# Patient Record
Sex: MALE | Race: WHITE | HISPANIC OR LATINO
[De-identification: names, ages, dates, MRNs, and addresses within clinical notes are randomized per-mention and may not be internally consistent; named-entity substitution may affect disease eponyms.]

---

## 2024-09-25 ENCOUNTER — NON-APPOINTMENT (OUTPATIENT)
Age: 24
End: 2024-09-25

## 2024-09-26 ENCOUNTER — APPOINTMENT (OUTPATIENT)
Dept: NEUROSURGERY | Facility: CLINIC | Age: 24
End: 2024-09-26
Payer: COMMERCIAL

## 2024-09-26 VITALS
WEIGHT: 177 LBS | HEIGHT: 65 IN | RESPIRATION RATE: 16 BRPM | BODY MASS INDEX: 29.49 KG/M2 | DIASTOLIC BLOOD PRESSURE: 79 MMHG | OXYGEN SATURATION: 96 % | HEART RATE: 89 BPM | SYSTOLIC BLOOD PRESSURE: 131 MMHG

## 2024-09-26 DIAGNOSIS — G91.9 HYDROCEPHALUS, UNSPECIFIED: ICD-10-CM

## 2024-09-26 DIAGNOSIS — I61.9 NONTRAUMATIC INTRACEREBRAL HEMORRHAGE, UNSPECIFIED: ICD-10-CM

## 2024-09-26 PROCEDURE — 99205 OFFICE O/P NEW HI 60 MIN: CPT

## 2024-09-26 NOTE — REVIEW OF SYSTEMS
[As Noted in HPI] : as noted in HPI [Negative] : Musculoskeletal [FreeTextEntry3] : occasional eye strain

## 2024-09-26 NOTE — ASSESSMENT
[FreeTextEntry1] : 24 year old male with spontaneous right frontal intraventricular hemorrhage. Angiogram negative. 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 Neurologically intact.  Discussion: Ct scan showed some hydrocephalus which is improving when compared to previous scans- likely the cause of the headache, n/v. Some pts can get a delayed lower pressure hydrocephalus- symptoms would include trouble with balance, walking, urinary incontinence, and short-term memory loss- usually progressive. We need to follow clinically and radiographically Ok to walk daily, would avoid strenuous workouts, do not take ANY recreational drugs specifically ecstasy and cocaine which can elevate your heart rate.  It is unusual for a 24-year-old that is healthy to have a brain bleed Usually caused by:  Vascular malformation, cavernoma, or a tumor (a tumor is unlikely)  Explained that cavernomas are not visible on angiograms, although DVAs which are often associated with cavernomas are visible on angiograms Recommendation: repeat angiogram and MRI in 6-8 weeks after the hemorrhage resoles. If it's a cavernoma in the ventricle- it would not be unreasonable to remove it- surgery in that area is safe but we need more images after hemorrhage resolves to better visualize the lesion. Cavernomas have a 1.5%-3% annual hemorrhage rate. In the ventricle, this could cause hydrocephalus and he is young, so resection is reasonable (if it is a cavernoma, or if there is any concern for tumor). Risks and benefits of surgery were discussed. May return to office after repeat MRI and angiogram are completed to discuss recommendations for next steps. contact office with any questions or concerns.   .Jarrett ODELL evaluated the patient with the nurse practitioner Joann Barthelemy and established the plan of care. I personally discuss this patient with the nurse practitioner at the time of the visit. I agree with the assessment and plan as written, unless noted below.

## 2024-09-26 NOTE — HISTORY OF PRESENT ILLNESS
[< 3 months] : less than 3 months [FreeTextEntry1] : brain bleed  [de-identified] : Mr. De Dios, a 24-year-old left-handed male with no significant medical history, he experienced headaches and vomiting for five days, worse in the morning, initially attributing the symptoms to food poisoning. On 8/28, his symptoms worsened, including feeling of faint and photosensitivity. He was treated at [facility] with IV fluids, magnesium, and Tylenol. Imaging was not available to review on [facility] PACS, but limited review of his imaging was reviewed on his [facility] bryce on his cell phone.  It appears that he had a CT head on 8/28 that showed spontaneous right lateral horn intraventricular hemorrhage with trapping of his right temporal horn of his right lateral ventricle. He had an angiogram that was negative for vascular malformation. 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 CT from 9/4 showed improved intraventricular hemorrhage and no hydrocephalus, with a smaller but still present spherical hyperdensity in the right frontal horn of the lateral ventricle measuring an estimated 1.5 cm. He did not have a surgical intervention or EVD placed in the hospital.  Today he reports feeling well back to his baseline. He reports infrequent headaches that usually occur at night 3/10.  He is here for a second opinion. Neuro Exam: intact

## 2024-09-26 NOTE — PHYSICAL EXAM
[General Appearance - Alert] : alert [General Appearance - In No Acute Distress] : in no acute distress [General Appearance - Well Nourished] : well nourished [Oriented To Time, Place, And Person] : oriented to person, place, and time [Person] : oriented to person [Place] : oriented to place [Time] : oriented to time [Short Term Intact] : short term memory intact [Remote Intact] : remote memory intact [Registration Intact] : recent registration memory intact [Span Intact] : the attention span was normal [Concentration Intact] : normal concentrating ability [Visual Intact] : visual attention was ~T not ~L decreased [Fluency] : fluency intact [Comprehension] : comprehension intact [Reading] : reading intact [Current Events] : adequate knowledge of current events [Past History] : adequate knowledge of personal past history [Vocabulary] : adequate range of vocabulary [Motor Strength] : muscle strength was normal in all four extremities [Motor Handedness Left-Handed] : the patient is left hand dominant [Balance] : balance was intact [PERRL With Normal Accommodation] : pupils were equal in size, round, reactive to light, with normal accommodation [Extraocular Movements] : extraocular movements were intact [] : no respiratory distress [Abnormal Walk] : normal gait [Involuntary Movements] : no involuntary movements were seen [Motor Tone] : muscle strength and tone were normal [Skin Color & Pigmentation] : normal skin color and pigmentation [Full Visual Field] : full visual field [Neck Appearance] : the appearance of the neck was normal [Over the Past 2 Weeks, Have You Felt Down, Depressed, or Hopeless?] : 1.) Over the past 2 weeks, have you felt down, depressed, or hopeless? No [Over the Past 2 Weeks, Have You Felt Little Interest or Pleasure Doing Things?] : 2.) Over the past 2 weeks, have you felt little interest or pleasure doing things? No [Cranial Nerves Optic (II)] : visual acuity intact bilaterally,  pupils equal round and reactive to light [Cranial Nerves Oculomotor (III)] : extraocular motion intact [Cranial Nerves Trigeminal (V)] : facial sensation intact symmetrically [Cranial Nerves Facial (VII)] : face symmetrical [Cranial Nerves Vestibulocochlear (VIII)] : hearing was intact bilaterally [Cranial Nerves Glossopharyngeal (IX)] : tongue and palate midline [Cranial Nerves Accessory (XI - Cranial And Spinal)] : head turning and shoulder shrug symmetric [Cranial Nerves Hypoglossal (XII)] : there was no tongue deviation with protrusion [No Muscle Atrophy] : normal bulk in all four extremities [Sensation Tactile Decrease] : light touch was intact [Romberg's Sign] : Romberg's sign was negtive [Past-pointing] : there was no past-pointing [Tremor] : no tremor present [2+] : Patella left 2+